# Patient Record
Sex: MALE | Race: WHITE
[De-identification: names, ages, dates, MRNs, and addresses within clinical notes are randomized per-mention and may not be internally consistent; named-entity substitution may affect disease eponyms.]

---

## 2019-10-07 ENCOUNTER — HOSPITAL ENCOUNTER (OUTPATIENT)
Dept: HOSPITAL 19 - COL.CR | Age: 79
LOS: 6 days | Discharge: HOME | End: 2019-10-13
Attending: INTERNAL MEDICINE
Payer: MEDICARE

## 2019-10-07 DIAGNOSIS — Z48.812: Primary | ICD-10-CM

## 2019-10-07 DIAGNOSIS — Z95.5: ICD-10-CM

## 2019-10-07 DIAGNOSIS — I21.9: ICD-10-CM

## 2019-12-13 ENCOUNTER — HOSPITAL ENCOUNTER (OUTPATIENT)
Dept: HOSPITAL 19 - COL.CR | Age: 79
LOS: 32 days | Discharge: HOME | End: 2020-01-14
Attending: INTERNAL MEDICINE
Payer: MEDICARE

## 2019-12-13 DIAGNOSIS — Z95.5: ICD-10-CM

## 2019-12-13 DIAGNOSIS — I25.10: ICD-10-CM

## 2019-12-13 DIAGNOSIS — Z48.812: Primary | ICD-10-CM

## 2022-01-02 ENCOUNTER — HOSPITAL ENCOUNTER (OUTPATIENT)
Dept: HOSPITAL 19 - COL.ER | Age: 82
Setting detail: OBSERVATION
LOS: 1 days | Discharge: HOME | End: 2022-01-03
Attending: INTERNAL MEDICINE | Admitting: INTERNAL MEDICINE
Payer: MEDICARE

## 2022-01-02 VITALS — WEIGHT: 181.88 LBS | HEIGHT: 67.99 IN | BODY MASS INDEX: 27.57 KG/M2

## 2022-01-02 VITALS — DIASTOLIC BLOOD PRESSURE: 77 MMHG | SYSTOLIC BLOOD PRESSURE: 140 MMHG | HEART RATE: 71 BPM

## 2022-01-02 VITALS — TEMPERATURE: 97.6 F | DIASTOLIC BLOOD PRESSURE: 56 MMHG | SYSTOLIC BLOOD PRESSURE: 120 MMHG | HEART RATE: 59 BPM

## 2022-01-02 VITALS — TEMPERATURE: 96.2 F

## 2022-01-02 VITALS — TEMPERATURE: 97.8 F | SYSTOLIC BLOOD PRESSURE: 122 MMHG | DIASTOLIC BLOOD PRESSURE: 65 MMHG | HEART RATE: 67 BPM

## 2022-01-02 VITALS — TEMPERATURE: 97.5 F | SYSTOLIC BLOOD PRESSURE: 121 MMHG | HEART RATE: 60 BPM | DIASTOLIC BLOOD PRESSURE: 64 MMHG

## 2022-01-02 VITALS — SYSTOLIC BLOOD PRESSURE: 135 MMHG | DIASTOLIC BLOOD PRESSURE: 74 MMHG | HEART RATE: 68 BPM

## 2022-01-02 DIAGNOSIS — T46.4X6A: ICD-10-CM

## 2022-01-02 DIAGNOSIS — Z91.128: ICD-10-CM

## 2022-01-02 DIAGNOSIS — I42.0: ICD-10-CM

## 2022-01-02 DIAGNOSIS — I10: ICD-10-CM

## 2022-01-02 DIAGNOSIS — R00.1: ICD-10-CM

## 2022-01-02 DIAGNOSIS — R55: Primary | ICD-10-CM

## 2022-01-02 DIAGNOSIS — E78.5: ICD-10-CM

## 2022-01-02 DIAGNOSIS — I34.0: ICD-10-CM

## 2022-01-02 DIAGNOSIS — I25.10: ICD-10-CM

## 2022-01-02 DIAGNOSIS — Z79.82: ICD-10-CM

## 2022-01-02 DIAGNOSIS — I25.2: ICD-10-CM

## 2022-01-02 DIAGNOSIS — Z95.5: ICD-10-CM

## 2022-01-02 DIAGNOSIS — Z95.1: ICD-10-CM

## 2022-01-02 DIAGNOSIS — I73.9: ICD-10-CM

## 2022-01-02 DIAGNOSIS — T45.526A: ICD-10-CM

## 2022-01-02 DIAGNOSIS — I65.23: ICD-10-CM

## 2022-01-02 DIAGNOSIS — L50.9: ICD-10-CM

## 2022-01-02 DIAGNOSIS — Z79.899: ICD-10-CM

## 2022-01-02 LAB
ALBUMIN SERPL-MCNC: 3.4 GM/DL (ref 3.4–4.8)
ALP SERPL-CCNC: 81 U/L (ref 40–150)
ALT SERPL-CCNC: 24 U/L (ref 0–55)
ANION GAP SERPL CALC-SCNC: 10 MMOL/L (ref 7–16)
AST SERPL-CCNC: 27 U/L (ref 5–34)
BASOPHILS # BLD: 0 K/MM3 (ref 0–0.2)
BASOPHILS NFR BLD AUTO: 0.4 % (ref 0–2)
BILIRUB SERPL-MCNC: 0.8 MG/DL (ref 0.2–1.2)
BUN SERPL-MCNC: 18 MG/DL (ref 8–26)
CALCIUM SERPL-MCNC: 8.4 MG/DL (ref 8.4–10.2)
CHLORIDE SERPL-SCNC: 111 MMOL/L (ref 98–107)
CO2 SERPL-SCNC: 19 MMOL/L (ref 23–31)
CREAT SERPL-SCNC: 0.83 MG/DL (ref 0.72–1.25)
EOSINOPHIL # BLD: 0.2 K/MM3 (ref 0–0.7)
EOSINOPHIL NFR BLD: 2.8 % (ref 0–4)
ERYTHROCYTE [DISTWIDTH] IN BLOOD BY AUTOMATED COUNT: 13.2 % (ref 11.5–14.5)
GLUCOSE SERPL-MCNC: 149 MG/DL (ref 70–99)
GRANULOCYTES # BLD AUTO: 59.8 % (ref 42.2–75.2)
HCT VFR BLD AUTO: 44.1 % (ref 42–52)
HGB BLD-MCNC: 14.4 G/DL (ref 13.5–18)
INR BLD: 1.2 (ref 0.8–3)
LYMPHOCYTES # BLD: 2 K/MM3 (ref 1.2–3.4)
LYMPHOCYTES NFR BLD: 27.8 % (ref 20–51)
MCH RBC QN AUTO: 28 PG (ref 27–31)
MCHC RBC AUTO-ENTMCNC: 33 G/DL (ref 33–37)
MCV RBC AUTO: 84 FL (ref 80–100)
MONOCYTES # BLD: 0.6 K/MM3 (ref 0.1–0.6)
MONOCYTES NFR BLD AUTO: 8.9 % (ref 1.7–9.3)
NEUTROPHILS # BLD: 4.3 K/MM3 (ref 1.4–6.5)
PLATELET # BLD AUTO: 231 K/MM3 (ref 130–400)
PMV BLD AUTO: 9.2 FL (ref 7.4–10.4)
POTASSIUM SERPL-SCNC: 3.7 MMOL/L (ref 3.5–4.5)
PROT SERPL-MCNC: 6.2 GM/DL (ref 6.2–8.1)
PROTHROMBIN TIME: 13 SECONDS (ref 9.7–12.8)
RBC # BLD AUTO: 5.23 M/MM3 (ref 4.2–5.6)
SODIUM SERPL-SCNC: 140 MMOL/L (ref 136–145)
TROPONIN I SERPL-MCNC: < 0.01 NG/ML (ref 0–0.03)

## 2022-01-02 PROCEDURE — G0378 HOSPITAL OBSERVATION PER HR: HCPCS

## 2022-01-02 NOTE — NUR
arrived on unit at 1245, alert and oriented, assisted off cart and ambulated
into bathroom, voided qs, then out of bathroom and into bed, full admission
assessment completed, see interventions for further info, provided water per
his request, denies needs wife at bedside

## 2022-01-03 VITALS — TEMPERATURE: 97.9 F | SYSTOLIC BLOOD PRESSURE: 141 MMHG | DIASTOLIC BLOOD PRESSURE: 81 MMHG | HEART RATE: 74 BPM

## 2022-01-03 VITALS — DIASTOLIC BLOOD PRESSURE: 54 MMHG | HEART RATE: 52 BPM | SYSTOLIC BLOOD PRESSURE: 125 MMHG | TEMPERATURE: 97.3 F

## 2022-01-03 VITALS — HEART RATE: 49 BPM | DIASTOLIC BLOOD PRESSURE: 54 MMHG | SYSTOLIC BLOOD PRESSURE: 128 MMHG

## 2022-01-03 VITALS — HEART RATE: 49 BPM | SYSTOLIC BLOOD PRESSURE: 132 MMHG | DIASTOLIC BLOOD PRESSURE: 65 MMHG | TEMPERATURE: 97.7 F

## 2022-01-03 LAB
ANION GAP SERPL CALC-SCNC: 7 MMOL/L (ref 7–16)
BUN SERPL-MCNC: 15 MG/DL (ref 8–26)
CALCIUM SERPL-MCNC: 8 MG/DL (ref 8.4–10.2)
CHLORIDE SERPL-SCNC: 111 MMOL/L (ref 98–107)
CO2 SERPL-SCNC: 24 MMOL/L (ref 23–31)
CREAT SERPL-SCNC: 1 MG/DL (ref 0.72–1.25)
GLUCOSE SERPL-MCNC: 90 MG/DL (ref 70–99)
MAGNESIUM SERPL-MCNC: 2.2 MG/DL (ref 1.6–2.6)
POTASSIUM SERPL-SCNC: 4.2 MMOL/L (ref 3.5–4.5)
SODIUM SERPL-SCNC: 142 MMOL/L (ref 136–145)

## 2022-01-03 NOTE — NUR
SW met with patient at bedside to discuss discharge plan. Patient reports that
he lives at home with his wife Nella ([phone]) on [address]. Patient is
fully independent with his activities of daily living and does not utilize any
DME to assist with mobility and has no oxygen needs at home. PCP is Dr. Escobedo and he utilizes [facility] for medications with no cost difficulty.
Patient reports that he does have a [facility] established and a copy of it is at
home. Patient is planning on returning home with no concerns.
 
Discharge plan: Home with spouse.

## 2022-01-03 NOTE — NUR
Discharge paperwork and instructions reviewed with patient. All questions
answered at this time. IV's dc'd by PCT. Pt walked out of facility with a
staff member at this time.

## 2022-01-03 NOTE — NUR
Pt assessment complete. Pt sitting up in bed upon entry, he is A/O x4. His
breathing is even and unlabored on RA. Pt denies SOB. Pt denies any dizziness,
or any through the night. Denies any N/V. POC discussed with patient who
verbalizes understanding. Will remain NPO until cardiology evaluates. No
further needs, call light within reach.